# Patient Record
Sex: FEMALE | Race: OTHER | HISPANIC OR LATINO | URBAN - METROPOLITAN AREA
[De-identification: names, ages, dates, MRNs, and addresses within clinical notes are randomized per-mention and may not be internally consistent; named-entity substitution may affect disease eponyms.]

---

## 2021-08-15 ENCOUNTER — EMERGENCY (EMERGENCY)
Facility: HOSPITAL | Age: 41
LOS: 1 days | Discharge: HOME | End: 2021-08-15
Admitting: EMERGENCY MEDICINE
Payer: MEDICAID

## 2021-08-15 VITALS
DIASTOLIC BLOOD PRESSURE: 62 MMHG | RESPIRATION RATE: 16 BRPM | SYSTOLIC BLOOD PRESSURE: 100 MMHG | OXYGEN SATURATION: 99 % | HEART RATE: 79 BPM | TEMPERATURE: 98 F

## 2021-08-15 VITALS
RESPIRATION RATE: 18 BRPM | HEIGHT: 64 IN | HEART RATE: 91 BPM | WEIGHT: 169.98 LBS | DIASTOLIC BLOOD PRESSURE: 68 MMHG | OXYGEN SATURATION: 98 % | SYSTOLIC BLOOD PRESSURE: 105 MMHG | TEMPERATURE: 98 F
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
## 2021-08-15 DIAGNOSIS — Z88.0 ALLERGY STATUS TO PENICILLIN: ICD-10-CM
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
## 2021-08-15 DIAGNOSIS — S92.001A UNSPECIFIED FRACTURE OF RIGHT CALCANEUS, INITIAL ENCOUNTER FOR CLOSED FRACTURE: ICD-10-CM
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
## 2021-08-15 DIAGNOSIS — M19.90 UNSPECIFIED OSTEOARTHRITIS, UNSPECIFIED SITE: ICD-10-CM
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
## 2021-08-15 DIAGNOSIS — Z88.6 ALLERGY STATUS TO ANALGESIC AGENT: ICD-10-CM

## 2021-08-15 DIAGNOSIS — M25.571 PAIN IN RIGHT ANKLE AND JOINTS OF RIGHT FOOT: ICD-10-CM

## 2021-08-15 DIAGNOSIS — F31.9 BIPOLAR DISORDER, UNSPECIFIED: ICD-10-CM

## 2021-08-15 PROCEDURE — 73600 X-RAY EXAM OF ANKLE: CPT | Mod: 26,RT

## 2021-08-15 PROCEDURE — 99284 EMERGENCY DEPT VISIT MOD MDM: CPT

## 2021-08-15 PROCEDURE — 73590 X-RAY EXAM OF LOWER LEG: CPT | Mod: 26,RT

## 2021-08-15 RX ORDER — OXYCODONE AND ACETAMINOPHEN 5; 325 MG/1; MG/1
1 TABLET ORAL ONCE
Refills: 0 | Status: DISCONTINUED | OUTPATIENT
Start: 2021-08-15 | End: 2021-08-15

## 2021-08-15 RX ADMIN — OXYCODONE AND ACETAMINOPHEN 1 TABLET(S): 5; 325 TABLET ORAL at 12:12

## 2021-08-15 NOTE — ED PROVIDER NOTE - MUSCULOSKELETAL, MLM
Spine appears normal, range of motion is not limited, no muscle or joint tenderness RLE: +tenderness along the achilles with swelling along the R posterior ankle (slightly more lateral than medial). negative Arguello's test, no calf tenderness, no overlying skin erythema, +decreased ROM of ankle secondary to pain but motor/sensation intact throughout

## 2021-08-15 NOTE — ED PROVIDER NOTE - PATIENT PORTAL LINK FT
You can access the FollowMyHealth Patient Portal offered by [facility] by registering at the following website: [email or web address]. By joining [email or web address]’s FollowMyHealth portal, you will also be able to view your health information using other applications (apps) compatible with our system.

## 2021-08-15 NOTE — ED PROVIDER NOTE - OBJECTIVE STATEMENT
42 yo F w/ PMHx of bipolar disorder, arthritis, sciatica and gastric bypass presents to the ED c/o R posterior ankle and lower leg pain. Pt states she was walking when she misstepped on the edge of a curb, heard a pop with immediate onset of pain to RLE. Pt is unable to bear weight. Pt notes she injured the R ankle few months ago and believes she exacerbated the injury. Pt denies chest pain, SOB, numbness/tingling/weakness or fall.

## 2021-08-15 NOTE — ED ADULT NURSE REASSESSMENT NOTE - NS ED NURSE REASSESS COMMENT FT1
Pt received from Anabella betancourt RN. Pt resting comfortably in bed. No s/s of acute distress. Will continue to monitor

## 2021-08-15 NOTE — ED PROVIDER NOTE - CLINICAL SUMMARY MEDICAL DECISION MAKING FREE TEXT BOX
Pt presents to the ED c/o RLE pain s/p misstep on the edge of a curb today. Pt is unable to bear weight,. Will order percocet for pain management, xrays of R tib/fib/ankle to rule out fracture. Will give crutches for weight bearing with bulky soto wrap and prescription for extra strength Tylenol sent to pt's pharmacy. Pt advised on orthopedics follow up. Pt presents to the ED c/o RLE pain s/p misstep on the edge of a curb today. Pt is unable to bear weight,. Will order percocet for pain management, xrays of R tib/fib/ankle to rule out fracture. If neg, will give crutches for weight bearing with bulky soto wrap and prescription for extra strength Tylenol sent to pt's pharmacy. Pt advised on orthopedics follow up.

## 2021-08-15 NOTE — ED PROVIDER NOTE - MUSCULOSKELETAL [+], MLM
JOINT PAIN/LIMITED RANGE OF MOTION Normal rate, regular rhythm.  Heart sounds S1, S2.  No murmurs, rubs or gallops.
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
## 2021-08-15 NOTE — ED PROVIDER NOTE - PROGRESS NOTE DETAILS
+Calcaneal fracture.  Pt placed in short leg splint.  Given crutches.  Will place URGENT ortho referral.  Pt stable on DC and leaves in NAD.

## 2021-08-16 PROBLEM — Z00.00 ENCOUNTER FOR PREVENTIVE HEALTH EXAMINATION: Status: ACTIVE | Noted: 2021-08-16

## 2021-08-18 ENCOUNTER — APPOINTMENT (OUTPATIENT)
Dept: ORTHOPEDIC SURGERY | Facility: CLINIC | Age: 41
End: 2021-08-18
Payer: MEDICAID

## 2021-08-18 VITALS — BODY MASS INDEX: 27.82 KG/M2 | RESPIRATION RATE: 16 BRPM | WEIGHT: 167 LBS | HEIGHT: 65 IN

## 2021-08-18 DIAGNOSIS — Z82.61 FAMILY HISTORY OF ARTHRITIS: ICD-10-CM

## 2021-08-18 DIAGNOSIS — Z87.898 PERSONAL HISTORY OF OTHER SPECIFIED CONDITIONS: ICD-10-CM

## 2021-08-18 DIAGNOSIS — M67.88 OTHER SPECIFIED DISORDERS OF SYNOVIUM AND TENDON, OTHER SITE: ICD-10-CM

## 2021-08-18 DIAGNOSIS — Z87.39 PERSONAL HISTORY OF OTHER DISEASES OF THE MUSCULOSKELETAL SYSTEM AND CONNECTIVE TISSUE: ICD-10-CM

## 2021-08-18 DIAGNOSIS — Z87.09 PERSONAL HISTORY OF OTHER DISEASES OF THE RESPIRATORY SYSTEM: ICD-10-CM

## 2021-08-18 DIAGNOSIS — F17.200 NICOTINE DEPENDENCE, UNSPECIFIED, UNCOMPLICATED: ICD-10-CM

## 2021-08-18 PROCEDURE — 99203 OFFICE O/P NEW LOW 30 MIN: CPT

## 2021-09-29 ENCOUNTER — APPOINTMENT (OUTPATIENT)
Dept: ORTHOPEDIC SURGERY | Facility: CLINIC | Age: 41
End: 2021-09-29

## 2021-11-04 ENCOUNTER — EMERGENCY (EMERGENCY)
Facility: HOSPITAL | Age: 41
LOS: 1 days | Discharge: HOME | End: 2021-11-04
Admitting: EMERGENCY MEDICINE
Payer: MEDICAID

## 2021-11-04 VITALS
HEART RATE: 102 BPM | HEIGHT: 64 IN | RESPIRATION RATE: 18 BRPM | WEIGHT: 164.91 LBS | TEMPERATURE: 98 F | DIASTOLIC BLOOD PRESSURE: 64 MMHG | OXYGEN SATURATION: 96 % | SYSTOLIC BLOOD PRESSURE: 114 MMHG

## 2021-11-04 DIAGNOSIS — Z88.6 ALLERGY STATUS TO ANALGESIC AGENT: ICD-10-CM

## 2021-11-04 DIAGNOSIS — M79.642 PAIN IN LEFT HAND: ICD-10-CM
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
## 2021-11-04 DIAGNOSIS — Z88.2 ALLERGY STATUS TO SULFONAMIDES: ICD-10-CM

## 2021-11-04 DIAGNOSIS — T63.441A TOXIC EFFECT OF VENOM OF BEES, ACCIDENTAL (UNINTENTIONAL), INITIAL ENCOUNTER: ICD-10-CM
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
## 2021-11-04 PROCEDURE — 99284 EMERGENCY DEPT VISIT MOD MDM: CPT

## 2021-11-04 RX ORDER — DIPHENHYDRAMINE HCL 50 MG
25 CAPSULE ORAL ONCE
Refills: 0 | Status: COMPLETED | OUTPATIENT
Start: 2021-11-04 | End: 2021-11-04

## 2021-11-04 RX ADMIN — Medication 25 MILLIGRAM(S): at 17:43

## 2021-11-04 NOTE — ED PROVIDER NOTE - OBJECTIVE STATEMENT
40 y/o female presents complaining of left palm pain after being stung by a bee. Patient presents with redness and pain to the left palm. Denies fever, chills, shortness of breath, chest pain, cough, nausea, and vomiting.

## 2021-11-04 NOTE — ED PROVIDER NOTE - PATIENT PORTAL LINK FT
You can access the FollowMyHealth Patient Portal offered by [facility] by registering at the following website: [email or web address]. By joining [employer]’s FollowMyHealth portal, you will also be able to view your health information using other applications (apps) compatible with our system.

## 2021-11-04 NOTE — ED ADULT TRIAGE NOTE - CHIEF COMPLAINT QUOTE
left palm hand pain from bee sting x1hr pta. no rash no hives no dyspnea no facial edema, speaking in loud long sentences, eating comfortably.

## 2021-11-04 NOTE — ED PROVIDER NOTE - CLINICAL SUMMARY MEDICAL DECISION MAKING FREE TEXT BOX
40 y/o female presents with bee sting to the left palm. Stinger removed. Patient appears well, given return precautions and discharged.

## 2021-11-19 ENCOUNTER — EMERGENCY (EMERGENCY)
Facility: HOSPITAL | Age: 41
LOS: 1 days | Discharge: HOME | End: 2021-11-19
Attending: EMERGENCY MEDICINE | Admitting: EMERGENCY MEDICINE
Payer: MEDICAID

## 2021-11-19 VITALS
RESPIRATION RATE: 16 BRPM | HEART RATE: 61 BPM | HEIGHT: 64 IN | TEMPERATURE: 98 F | WEIGHT: 164.91 LBS | SYSTOLIC BLOOD PRESSURE: 107 MMHG | OXYGEN SATURATION: 99 % | DIASTOLIC BLOOD PRESSURE: 66 MMHG

## 2021-11-19 DIAGNOSIS — M13.80 OTHER SPECIFIED ARTHRITIS, UNSPECIFIED SITE: ICD-10-CM

## 2021-11-19 DIAGNOSIS — M25.511 PAIN IN RIGHT SHOULDER: ICD-10-CM
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
## 2021-11-19 DIAGNOSIS — Z88.0 ALLERGY STATUS TO PENICILLIN: ICD-10-CM

## 2021-11-19 DIAGNOSIS — M54.50 LOW BACK PAIN, UNSPECIFIED: ICD-10-CM

## 2021-11-19 DIAGNOSIS — M25.561 PAIN IN RIGHT KNEE: ICD-10-CM
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
## 2021-11-19 DIAGNOSIS — Z98.84 BARIATRIC SURGERY STATUS: ICD-10-CM

## 2021-11-19 DIAGNOSIS — Z88.6 ALLERGY STATUS TO ANALGESIC AGENT: ICD-10-CM

## 2021-11-19 PROCEDURE — 99284 EMERGENCY DEPT VISIT MOD MDM: CPT

## 2021-11-19 RX ORDER — ACETAMINOPHEN 500 MG
975 TABLET ORAL ONCE
Refills: 0 | Status: COMPLETED | OUTPATIENT
Start: 2021-11-19 | End: 2021-11-19

## 2021-11-19 RX ADMIN — Medication 500 MILLIGRAM(S): at 21:27

## 2021-11-19 RX ADMIN — Medication 975 MILLIGRAM(S): at 21:11

## 2021-11-19 RX ADMIN — Medication 500 MILLIGRAM(S): at 21:25

## 2021-11-19 RX ADMIN — Medication 975 MILLIGRAM(S): at 19:45
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
## 2021-11-19 NOTE — ED PROVIDER NOTE - NSFOLLOWUPINSTRUCTIONS_ED_ALL_ED_FT
Rest and stay well hydrated.    Follow up with your primary care physician in 1-3 days.    Take over the counter acetaminophen (Tylenol) 650-1000 mg every 4-6 hours as needed for pain. Do not take more than 3000 mg in a 24 hour period. Be aware many over the counter and prescription medications also contain acetaminophen (Tylenol).     SEEK IMMEDIATE MEDICAL CARE IF YOU HAVE ANY OF THE FOLLOWING SYMPTOMS: numbness, tingling, or weakness in your arms or legs, severe neck pain, changes in bowel or bladder control, shortness of breath, chest pain, blood in your urine/stool/vomit, headache, visual changes, lightheadedness/dizziness, or fainting. Rest and stay well hydrated.    Follow up with your primary care physician in 1-3 days.    Take over the counter acetaminophen (Tylenol) 650-1000 mg every 4-6 hours as needed for pain. Do not take more than 3000 mg in a 24 hour period. Be aware many over the counter and prescription medications also contain acetaminophen (Tylenol).     Take prescription naproxen 500 mg twice daily as needed for pain.    SEEK IMMEDIATE MEDICAL CARE IF YOU HAVE ANY OF THE FOLLOWING SYMPTOMS: numbness, tingling, or weakness in your arms or legs, severe neck pain, changes in bowel or bladder control, shortness of breath, chest pain, blood in your urine/stool/vomit, headache, visual changes, lightheadedness/dizziness, or fainting.

## 2021-11-19 NOTE — ED PROVIDER NOTE - NS ED ROS FT
Review of Systems    Constitutional: (-) fever, (-) chills, (-) fatigue  HEENT: (-) sore throat, (-) hearing loss, (-) nasal congestion  Cardiovascular: (-) chest pain, (-) syncope  Respiratory: (-) cough, (-) shortness of breath  Gastrointestinal: (-) vomiting, (-) diarrhea, (-) abdominal pain  Musculoskeletal: (-) neck pain, (+) back pain, (+) shoulder pain  Integumentary: (-) rash, (-) edema, (-) wound  Neurological: (-) headache, (-) altered mental status    Except as documented in the HPI, all other systems are negative.
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
## 2021-11-19 NOTE — ED ADULT NURSE NOTE - CHPI ED NUR SYMPTOMS NEG
no abrasion/no blurred vision/no change in level of consciousness/no loss of consciousness/no seizure/no vomiting/no weakness

## 2021-11-19 NOTE — ED PROVIDER NOTE - PATIENT PORTAL LINK FT
You can access the FollowMyHealth Patient Portal offered by [facility] by registering at the following website: [email or web address]. By joining [employer]’s FollowMyHealth portal, you will also be able to view your health information using other applications (apps) compatible with our system.

## 2021-11-19 NOTE — ED PROVIDER NOTE - PHYSICAL EXAMINATION
CONSTITUTIONAL: non-toxic, well appearing  SKIN: no rash, no petechiae.  EYES: PERRL, EOMI, pink conjunctiva, anicteric  ENT: NC/AT. Tongue and uvular midline, no exudates, moist mucous membranes. No septal hematoma visualized. TM intact bilaterally, no hemotympanum bilaterally, negative Garcia sign bilaterally.  NECK: Supple; no meningismus, no JVD  CARD: RRR, no murmurs, equal radial pulses bilaterally 2+  RESP: CTAB, no respiratory distress  ABD: Soft, non-tender, non-distended, no peritoneal signs, no CVA tenderness  SPINE: no midline bony tenderness over cervical, thoracic, or lumbar spine  EXT: Normal ROM x4. No edema. Pelvis stable. FROM of bilateral hips and knees, no bony tenderness.   NEURO: Alert, oriented. Neuro exam nonfocal, equal strength bilaterally. Ambulatory without assistance with steady gait. 5/5 strength bilateral upper and lower extremities.   PSYCH: Cooperative, appropriate.

## 2022-01-01 ENCOUNTER — EMERGENCY (EMERGENCY)
Facility: HOSPITAL | Age: 42
LOS: 1 days | Discharge: HOME | End: 2022-01-01
Admitting: EMERGENCY MEDICINE
Payer: MEDICAID

## 2022-01-01 VITALS
OXYGEN SATURATION: 99 % | RESPIRATION RATE: 16 BRPM | HEART RATE: 84 BPM | SYSTOLIC BLOOD PRESSURE: 125 MMHG | DIASTOLIC BLOOD PRESSURE: 85 MMHG | TEMPERATURE: 99 F

## 2022-01-01 DIAGNOSIS — Z88.6 ALLERGY STATUS TO ANALGESIC AGENT: ICD-10-CM
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
## 2022-01-01 DIAGNOSIS — M79.642 PAIN IN LEFT HAND: ICD-10-CM

## 2022-01-01 DIAGNOSIS — S60.222A CONTUSION OF LEFT HAND, INITIAL ENCOUNTER: ICD-10-CM
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
## 2022-01-01 PROCEDURE — 99283 EMERGENCY DEPT VISIT LOW MDM: CPT | Mod: 25

## 2022-01-01 PROCEDURE — 73130 X-RAY EXAM OF HAND: CPT | Mod: 26,LT

## 2022-01-01 RX ORDER — ACETAMINOPHEN 500 MG
650 TABLET ORAL ONCE
Refills: 0 | Status: COMPLETED | OUTPATIENT
Start: 2022-01-01 | End: 2022-01-01

## 2022-01-01 RX ADMIN — Medication 650 MILLIGRAM(S): at 12:03
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
## 2022-01-01 NOTE — ED PROVIDER NOTE - CHPI ED SYMPTOMS NEG
no thumb pain, no wrist pain, no neck pain, no hematuria, no nausea, no SOB, no chest pain/no back pain/no vomiting

## 2022-01-01 NOTE — ED PROVIDER NOTE - MUSCULOSKELETAL, MLM
Left hand 4x4cm area of bruising crossing her 2nd, 3rd, and 4th, knuckles and the dorsum of her hand, pain with ROM with flexion of the digits which limits the full flexion of the digits. Normal alignment, no crepitus. Skin is intact. No snuff box tenderness. Normal ROM of the thumb. No tenderness to the wrist, forearm, elbow, and remainder of the extremity. No midline vertebral tenderness or step off deformity. 3cm bruise on right buttocks.

## 2022-01-01 NOTE — ED PROVIDER NOTE - PATIENT PORTAL LINK FT
You can access the FollowMyHealth Patient Portal offered by [facility] by registering at the following website: [email or web address]. By joining [employer]’s FollowMyHealth portal, you will also be able to view your health information using other applications (apps) compatible with our system.
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
## 2022-01-23 ENCOUNTER — EMERGENCY (EMERGENCY)
Facility: HOSPITAL | Age: 42
LOS: 1 days | Discharge: ANOTHER HOSPITAL | End: 2022-01-23
Attending: EMERGENCY MEDICINE | Admitting: EMERGENCY MEDICINE
Payer: MEDICAID

## 2022-01-23 VITALS
TEMPERATURE: 98 F | HEIGHT: 64 IN | HEART RATE: 75 BPM | OXYGEN SATURATION: 98 % | RESPIRATION RATE: 16 BRPM | WEIGHT: 169.98 LBS | DIASTOLIC BLOOD PRESSURE: 75 MMHG | SYSTOLIC BLOOD PRESSURE: 110 MMHG

## 2022-01-23 VITALS
DIASTOLIC BLOOD PRESSURE: 76 MMHG | TEMPERATURE: 98 F | OXYGEN SATURATION: 97 % | SYSTOLIC BLOOD PRESSURE: 118 MMHG | RESPIRATION RATE: 17 BRPM | HEART RATE: 85 BPM

## 2022-01-23 DIAGNOSIS — Z88.2 ALLERGY STATUS TO SULFONAMIDES: ICD-10-CM

## 2022-01-23 DIAGNOSIS — F31.62 BIPOLAR DISORDER, CURRENT EPISODE MIXED, MODERATE: ICD-10-CM

## 2022-01-23 DIAGNOSIS — R45.851 SUICIDAL IDEATIONS: ICD-10-CM
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
## 2022-01-23 DIAGNOSIS — S60.946A UNSPECIFIED SUPERFICIAL INJURY OF RIGHT LITTLE FINGER, INITIAL ENCOUNTER: ICD-10-CM

## 2022-01-23 DIAGNOSIS — F32.9 MAJOR DEPRESSIVE DISORDER, SINGLE EPISODE, UNSPECIFIED: ICD-10-CM

## 2022-01-23 DIAGNOSIS — F33.1 MAJOR DEPRESSIVE DISORDER, RECURRENT, MODERATE: ICD-10-CM

## 2022-01-23 DIAGNOSIS — F15.10 OTHER STIMULANT ABUSE, UNCOMPLICATED: ICD-10-CM

## 2022-01-23 DIAGNOSIS — Z88.6 ALLERGY STATUS TO ANALGESIC AGENT: ICD-10-CM
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
## 2022-01-23 DIAGNOSIS — U07.1 COVID-19: ICD-10-CM

## 2022-01-23 LAB
ALBUMIN SERPL ELPH-MCNC: 3 G/DL — LOW (ref 3.4–5)
ALP SERPL-CCNC: 104 U/L — SIGNIFICANT CHANGE UP (ref 40–120)
ALT FLD-CCNC: 22 U/L — SIGNIFICANT CHANGE UP (ref 12–42)
AMPHET UR-MCNC: POSITIVE
ANION GAP SERPL CALC-SCNC: 10 MMOL/L — SIGNIFICANT CHANGE UP (ref 9–16)
APAP SERPL-MCNC: 15.5 UG/ML — SIGNIFICANT CHANGE UP (ref 10–30)
APPEARANCE UR: CLEAR — SIGNIFICANT CHANGE UP
AST SERPL-CCNC: 22 U/L — SIGNIFICANT CHANGE UP (ref 15–37)
BARBITURATES UR SCN-MCNC: NEGATIVE — SIGNIFICANT CHANGE UP
BENZODIAZ UR-MCNC: NEGATIVE — SIGNIFICANT CHANGE UP
BILIRUB SERPL-MCNC: 0.2 MG/DL — SIGNIFICANT CHANGE UP (ref 0.2–1.2)
BILIRUB UR-MCNC: ABNORMAL
BUN SERPL-MCNC: 19 MG/DL — SIGNIFICANT CHANGE UP (ref 7–23)
CALCIUM SERPL-MCNC: 7.9 MG/DL — LOW (ref 8.5–10.5)
CHLORIDE SERPL-SCNC: 105 MMOL/L — SIGNIFICANT CHANGE UP (ref 96–108)
CO2 SERPL-SCNC: 23 MMOL/L — SIGNIFICANT CHANGE UP (ref 22–31)
COCAINE METAB.OTHER UR-MCNC: NEGATIVE — SIGNIFICANT CHANGE UP
COLOR SPEC: YELLOW — SIGNIFICANT CHANGE UP
CREAT SERPL-MCNC: 0.86 MG/DL — SIGNIFICANT CHANGE UP (ref 0.5–1.3)
DIFF PNL FLD: NEGATIVE — SIGNIFICANT CHANGE UP
ETHANOL SERPL-MCNC: 92 MG/DL — HIGH
GLUCOSE SERPL-MCNC: 103 MG/DL — HIGH (ref 70–99)
GLUCOSE UR QL: NEGATIVE — SIGNIFICANT CHANGE UP
HCG UR QL: NEGATIVE — SIGNIFICANT CHANGE UP
HCT VFR BLD CALC: 33 % — LOW (ref 34.5–45)
HGB BLD-MCNC: 10.3 G/DL — LOW (ref 11.5–15.5)
KETONES UR-MCNC: ABNORMAL MG/DL
LEUKOCYTE ESTERASE UR-ACNC: NEGATIVE — SIGNIFICANT CHANGE UP
MCHC RBC-ENTMCNC: 27 PG — SIGNIFICANT CHANGE UP (ref 27–34)
MCHC RBC-ENTMCNC: 31.2 GM/DL — LOW (ref 32–36)
MCV RBC AUTO: 86.6 FL — SIGNIFICANT CHANGE UP (ref 80–100)
METHADONE UR-MCNC: NEGATIVE — SIGNIFICANT CHANGE UP
NITRITE UR-MCNC: NEGATIVE — SIGNIFICANT CHANGE UP
NRBC # BLD: 0 /100 WBCS — SIGNIFICANT CHANGE UP (ref 0–0)
OPIATES UR-MCNC: NEGATIVE — SIGNIFICANT CHANGE UP
PCP SPEC-MCNC: SIGNIFICANT CHANGE UP
PCP UR-MCNC: NEGATIVE — SIGNIFICANT CHANGE UP
PH UR: 5 — SIGNIFICANT CHANGE UP (ref 5–8)
PLATELET # BLD AUTO: 381 K/UL — SIGNIFICANT CHANGE UP (ref 150–400)
POTASSIUM SERPL-MCNC: 3.8 MMOL/L — SIGNIFICANT CHANGE UP (ref 3.5–5.3)
POTASSIUM SERPL-SCNC: 3.8 MMOL/L — SIGNIFICANT CHANGE UP (ref 3.5–5.3)
PROT SERPL-MCNC: 7 G/DL — SIGNIFICANT CHANGE UP (ref 6.4–8.2)
PROT UR-MCNC: NEGATIVE MG/DL — SIGNIFICANT CHANGE UP
RBC # BLD: 3.81 M/UL — SIGNIFICANT CHANGE UP (ref 3.8–5.2)
RBC # FLD: 16 % — HIGH (ref 10.3–14.5)
SALICYLATES SERPL-MCNC: 3.2 MG/DL — SIGNIFICANT CHANGE UP (ref 2.8–20)
SARS-COV-2 RNA SPEC QL NAA+PROBE: DETECTED
SODIUM SERPL-SCNC: 138 MMOL/L — SIGNIFICANT CHANGE UP (ref 132–145)
SP GR SPEC: >=1.03 — SIGNIFICANT CHANGE UP (ref 1–1.03)
THC UR QL: POSITIVE
TSH SERPL-MCNC: 3.01 UIU/ML — SIGNIFICANT CHANGE UP (ref 0.36–3.74)
UROBILINOGEN FLD QL: 0.2 E.U./DL — SIGNIFICANT CHANGE UP
WBC # BLD: 6.9 K/UL — SIGNIFICANT CHANGE UP (ref 3.8–10.5)
WBC # FLD AUTO: 6.9 K/UL — SIGNIFICANT CHANGE UP (ref 3.8–10.5)

## 2022-01-23 PROCEDURE — 93010 ELECTROCARDIOGRAM REPORT: CPT

## 2022-01-23 PROCEDURE — 73130 X-RAY EXAM OF HAND: CPT | Mod: 26,RT

## 2022-01-23 PROCEDURE — 99285 EMERGENCY DEPT VISIT HI MDM: CPT | Mod: 25

## 2022-01-23 PROCEDURE — 90792 PSYCH DIAG EVAL W/MED SRVCS: CPT | Mod: 95

## 2022-01-23 RX ORDER — PANTOPRAZOLE SODIUM 20 MG/1
40 TABLET, DELAYED RELEASE ORAL ONCE
Refills: 0 | Status: COMPLETED | OUTPATIENT
Start: 2022-01-23 | End: 2022-01-23

## 2022-01-23 RX ORDER — OXYCODONE AND ACETAMINOPHEN 5; 325 MG/1; MG/1
1 TABLET ORAL ONCE
Refills: 0 | Status: DISCONTINUED | OUTPATIENT
Start: 2022-01-23 | End: 2022-01-23

## 2022-01-23 RX ORDER — NICOTINE POLACRILEX 2 MG
1 GUM BUCCAL DAILY
Refills: 0 | Status: DISCONTINUED | OUTPATIENT
Start: 2022-01-23 | End: 2022-01-26

## 2022-01-23 RX ORDER — ACETAMINOPHEN 500 MG
975 TABLET ORAL ONCE
Refills: 0 | Status: COMPLETED | OUTPATIENT
Start: 2022-01-23 | End: 2022-01-23

## 2022-01-23 RX ADMIN — PANTOPRAZOLE SODIUM 40 MILLIGRAM(S): 20 TABLET, DELAYED RELEASE ORAL at 17:40

## 2022-01-23 RX ADMIN — OXYCODONE AND ACETAMINOPHEN 1 TABLET(S): 5; 325 TABLET ORAL at 17:40

## 2022-01-23 RX ADMIN — Medication 975 MILLIGRAM(S): at 10:33

## 2022-01-23 RX ADMIN — Medication 1 PATCH: at 10:33

## 2022-01-23 NOTE — ED PROVIDER NOTE - PROGRESS NOTE DETAILS
Pt medically cleared for psychiatry consultation. Psychiatry consult appreciated; d/w Dr. Grimaldo. Will re-assess pt later this am for active suicidality. Given pt's covid status, if hospitalization warranted, limited to covid psych unit. Will s/o to day team pending psychiatry re-evaluation.
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
## 2022-01-23 NOTE — ED PROVIDER NOTE - CARE PLAN
1 Principal Discharge DX:	Major depression   Principal Discharge DX:	Major depression  Secondary Diagnosis:	Injury of right little finger

## 2022-01-23 NOTE — ED BEHAVIORAL HEALTH ASSESSMENT NOTE - CURRENT MEDICATION
gives inconsistent report- first says she stopped medications weeks ago, then says she is still taking seroquel 100mg HS and trazodone 50 HS but that she stopped sertraline

## 2022-01-23 NOTE — ED ADULT TRIAGE NOTE - CHIEF COMPLAINT QUOTE
"I need to go back to a psych [facility]. I have thoughts of hurting myself and others. I haven't been taking my psych meds." Pt with no specific plan to hurt herself. Admits to drinking alcohol tonight.

## 2022-01-23 NOTE — ED PROVIDER NOTE - CLINICAL SUMMARY MEDICAL DECISION MAKING FREE TEXT BOX
placed on suicide precautions 1:1 observation, will obtain medical screening labs, ekg. Psychiatry consult if/when medically cleared. placed on suicide precautions 1:1 observation, will obtain medical screening labs, ekg. Psychiatry consult if/when medically cleared.    Pt has an injury to R 5th digit, kyle taped, no clear acute fx on xray, neurovasc intact, cleared to continue wearing kyle tape for comfort while in psychiatric unit. PRN tylenol for pain. Does not require splinting or emergent Hand specialist f/u.
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
## 2022-01-23 NOTE — ED BEHAVIORAL HEALTH NOTE - BEHAVIORAL HEALTH NOTE
Telepsychiatry Update    Discussed hand injury with ER MD attending. Pt is medically cleared (please refer to provider note by Dr. Aguilera). Patient will require kyle tape, can receive Tylenol PRN for pain, and will need to follow up with hand specialist after discharge.     Discussed with [facility] psychiatry MD. They will accept pt with 1:1 on unit due to tape.

## 2022-01-23 NOTE — ED BEHAVIORAL HEALTH ASSESSMENT NOTE - PRIMARY DX
Bipolar disorder, current episode mixed, moderate MDD (major depressive disorder), recurrent episode, moderate
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
## 2022-01-23 NOTE — ED BEHAVIORAL HEALTH NOTE - BEHAVIORAL HEALTH NOTE
============  ED COURSE  ============  SOURCE:  RN and secondhand ED documentation.  ARRIVAL:  Per chart and RN, patient arrived as a walk-in unaccompanied by supports. Per RN, patient was calm upon arrival, and compliant with triage process.  BELONGINGS:  No belongings of note. All belongings were provided to hospital security, and patient is currently in a gown with a 1:1 staff member.  BEHAVIOR: RN stated that overnight pt was calm, slept for duration of the night, presenting with linear thought process, is AAOx3, presenting with stable mood and appropriate affect, remains in good behavioral and impulse control, is not currently violent/aggressive. Per chart, pt was reporting SI (no plan/intent indicated), denies HI/A/VH, reported EtOH consumption prior to arrival to the ED.   TREATMENT:  Per chart and RN, patient did not require PRN medications.   VISITORS:  None

## 2022-01-23 NOTE — ED ADULT NURSE NOTE - OBJECTIVE STATEMENT
pt a&ox3 states "I need to go back to a psych [facility]. I have thoughts of hurting myself and others. I haven't been taking my psych meds." Pt with no specific plan to hurt herself. Admits to drinking alcohol tonight. has dried white residue on nose and reports that she snorted her gabapentin today "to try it." belongings secured. 1:1 observation in place. will continue to monitor.

## 2022-01-23 NOTE — ED BEHAVIORAL HEALTH ASSESSMENT NOTE - RISK ASSESSMENT
Moderate Acute Suicide Risk chronic rf: hx attempt, hx NSSI, admissions, PEBBLES  acute rf: active PEBBLES, irritability  pf: help seeking
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
## 2022-01-23 NOTE — ED ADULT NURSE REASSESSMENT NOTE - NS ED NURSE REASSESS COMMENT FT1
Pt received from previous shift RN A&Ox3, spon resps on Ra unlabored, NAD.  Pt remains on a 1:1 for SI. Report has been given to admitted floor. Pending transportation.

## 2022-03-17 PROBLEM — Z78.9 OTHER SPECIFIED HEALTH STATUS: Chronic | Status: INACTIVE | Noted: 2021-08-15 | Resolved: 2022-01-23

## 2022-08-15 NOTE — ED PROVIDER NOTE - SPECIALTY CARE
Methotrexate Counseling:  Patient counseled regarding adverse effects of methotrexate including but not limited to nausea, vomiting, abnormalities in liver function tests. Patients may develop mouth sores, rash, diarrhea, and abnormalities in blood counts. The patient understands that monitoring is required including LFT's and blood counts.  There is a rare possibility of scarring of the liver and lung problems that can occur when taking methotrexate. Persistent nausea, loss of appetite, pale stools, dark urine, cough, and shortness of breath should be reported immediately. Patient advised to discontinue methotrexate treatment at least three months before attempting to become pregnant.  I discussed the need for folate supplements while taking methotrexate.  These supplements can decrease side effects during methotrexate treatment. The patient verbalized understanding of the proper use and possible adverse effects of methotrexate.  All of the patient's questions and concerns were addressed. Orthopedic Surgery

## 2022-09-27 NOTE — ED ADULT TRIAGE NOTE - HEIGHT IN FEET
-71-5

## 2022-12-02 ENCOUNTER — EMERGENCY (EMERGENCY)
Facility: HOSPITAL | Age: 42
LOS: 1 days | Discharge: HOME | End: 2022-12-02
Admitting: EMERGENCY MEDICINE

## 2022-12-02 VITALS
WEIGHT: 184.97 LBS | HEART RATE: 83 BPM | OXYGEN SATURATION: 95 % | RESPIRATION RATE: 18 BRPM | HEIGHT: 65 IN | DIASTOLIC BLOOD PRESSURE: 79 MMHG | TEMPERATURE: 98 F | SYSTOLIC BLOOD PRESSURE: 121 MMHG

## 2022-12-02 LAB
APPEARANCE UR: CLEAR — SIGNIFICANT CHANGE UP
BILIRUB UR-MCNC: NEGATIVE — SIGNIFICANT CHANGE UP
COLOR SPEC: YELLOW — SIGNIFICANT CHANGE UP
DIFF PNL FLD: NEGATIVE — SIGNIFICANT CHANGE UP
GLUCOSE UR QL: NEGATIVE — SIGNIFICANT CHANGE UP
HCG UR QL: NEGATIVE — SIGNIFICANT CHANGE UP
KETONES UR-MCNC: NEGATIVE — SIGNIFICANT CHANGE UP
LEUKOCYTE ESTERASE UR-ACNC: NEGATIVE — SIGNIFICANT CHANGE UP
NITRITE UR-MCNC: NEGATIVE — SIGNIFICANT CHANGE UP
PH UR: 6 — SIGNIFICANT CHANGE UP (ref 5–8)
PROT UR-MCNC: NEGATIVE MG/DL — SIGNIFICANT CHANGE UP
SP GR SPEC: 1.02 — SIGNIFICANT CHANGE UP (ref 1–1.03)
UROBILINOGEN FLD QL: 0.2 E.U./DL — SIGNIFICANT CHANGE UP

## 2022-12-02 PROCEDURE — 99284 EMERGENCY DEPT VISIT MOD MDM: CPT

## 2022-12-02 RX ORDER — DEXAMETHASONE 0.5 MG/5ML
6 ELIXIR ORAL ONCE
Refills: 0 | Status: COMPLETED | OUTPATIENT
Start: 2022-12-02 | End: 2022-12-02

## 2022-12-02 RX ORDER — DIAZEPAM 5 MG
5 TABLET ORAL ONCE
Refills: 0 | Status: DISCONTINUED | OUTPATIENT
Start: 2022-12-02 | End: 2022-12-02

## 2022-12-02 RX ORDER — LIDOCAINE 4 G/100G
1 CREAM TOPICAL ONCE
Refills: 0 | Status: COMPLETED | OUTPATIENT
Start: 2022-12-02 | End: 2022-12-02

## 2022-12-02 RX ORDER — KETOROLAC TROMETHAMINE 30 MG/ML
60 SYRINGE (ML) INJECTION ONCE
Refills: 0 | Status: DISCONTINUED | OUTPATIENT
Start: 2022-12-02 | End: 2022-12-02

## 2022-12-02 RX ADMIN — Medication 5 MILLIGRAM(S): at 04:32

## 2022-12-02 RX ADMIN — LIDOCAINE 1 PATCH: 4 CREAM TOPICAL at 05:39

## 2022-12-02 RX ADMIN — Medication 60 MILLIGRAM(S): at 04:31

## 2022-12-02 RX ADMIN — Medication 6 MILLIGRAM(S): at 04:31

## 2022-12-02 NOTE — ED PROVIDER NOTE - NSICDXPASTMEDICALHX_GEN_ALL_CORE_FT
PAST MEDICAL HISTORY:  Arthritis     Bipolar disorder     Chronic back pain      PAST MEDICAL HISTORY:  Arthritis     Bipolar disorder     Chronic back pain     Lumbar herniated disc

## 2022-12-02 NOTE — ED PROVIDER NOTE - PATIENT PORTAL LINK FT
You can access the FollowMyHealth Patient Portal offered by [facility] by registering at the following website: [email or web address]. By joining [employer]’s FollowMyHealth portal, you will also be able to view your health information using other applications (apps) compatible with our system.

## 2022-12-02 NOTE — ED PROVIDER NOTE - CARE PROVIDER_API CALL
Elton Don  [address] [place] [address]  Phone: [phone]  Fax: [phone]  Follow Up Time:     Arnaldo Lay)  Orthopedics  [address] [address]  Phone: [phone]  Fax: [phone]  Follow Up Time:

## 2022-12-02 NOTE — ED PROVIDER NOTE - PROGRESS NOTE DETAILS
pain improved s/p IM toradol/dex with po valium in the ED, ambulatory with steady gait, no saddle anesthesia, voiding spontaneously, NV and neurologically intact on exam. encouraged prompt f/u with pain management and will provide additional lidocaine patches for pain control. precautions discussed, pt amendable to current plan

## 2022-12-02 NOTE — ED PROVIDER NOTE - CLINICAL SUMMARY MEDICAL DECISION MAKING FREE TEXT BOX
pt with h/o herniated lumbar disc and chronic LBP, atraumatic and ambulatory in the ED with steady gait. no s/s of bowel/urinary involvement or saddle anesthesia, voiding spontaneously, U/A negative, pain adequately controlled, improved s/p IM toradol/dex with po valium in the ED, ambulatory with steady gait, NV and neurologically intact on exam. AFVSS at time of d/c, pt non-toxic appearing, results, ddx, and f/u plans discussed with pt at bedside, d/c'd home to f/u with pain management, strict return precautions discussed, prompt return to ER for any worsening or new sx, pt verbalized understanding.

## 2022-12-02 NOTE — ED ADULT NURSE NOTE - IN THE PAST 12 MONTHS HAVE YOU USED DRUGS OTHER THAN THOSE REQUIRED FOR MEDICAL REASON?
Note Text (......Xxx Chief Complaint.): This diagnosis correlates with the Detail Level: Detailed Other (Free Text): T Spot sent to [facility] No

## 2022-12-02 NOTE — ED ADULT TRIAGE NOTE - CHIEF COMPLAINT QUOTE
BIBA. Pt states she was tying her shoe when her back started to hurt. Pt reports chronic back pain that radiates down both legs but tonight its worse on the left side or back. No further at triage. Pt ambulating without issue.

## 2022-12-02 NOTE — ED PROVIDER NOTE - OBJECTIVE STATEMENT
43 yo F with PMHx of chronic back pain 2/2 herniated lumbar disc, schizoaffective, bipolar, presenting c/o worsening lower back pain. 41 yo F with PMHx of chronic back pain 2/2 herniated lumbar disc, schizoaffective, bipolar, presenting c/o worsening lower back pain x 1 wk. Pt reports usually taking tylenol and gabapentin for chronic back pain 2/2 herniated lumbar disc. Was bending over to tie her shoes one wk ago and noted progressive worsening pain radiating to BLE and hips. Noted R > L and currently doesn't have a pain management doctor. Reports receiving a cortisone shot to the back approximately 7-8 yrs ago. Denies fever, chills, dysuria, hematuria, flank pain, change in urinary/bowel function, numbness, tingling, focal weakness, abdominal pain, N/V/D/C, melena, hematochezia, CP, SOB, palpitations, diaphoresis, dizziness, HA, cough, rash, trauma, and fall.

## 2022-12-02 NOTE — ED PROVIDER NOTE - PHYSICAL EXAMINATION
Gen - WDWN, NAD, comfortable and non-toxic appearing  Skin - warm, dry, intact   HEENT - AT/NC, no nasal discharge, airway patent, neck supple and FROM  CV - S1S2, R/R/R  Resp - CTAB, no r/r/w  GI - soft, ND, NT, no CVAT b/l   MS - No acute or gross deformities noted to extremities. mild paraspinal tenderness, no midline tenderness, step off, crepitus, erythema, edema, ecchymosis, or deformity, +straight leg test.   Neuro - AxOx3, ambulatory with steady gait Gen - Unkempt F, NAD, comfortable and non-toxic appearing  Skin - warm, dry, intact   HEENT - AT/NC, no nasal discharge, airway patent, neck supple and FROM  CV - S1S2, R/R/R  Resp - CTAB, no r/r/w  GI - soft, ND, NT, no CVAT b/l    - no saddle anesthesia   MS - No acute or gross deformities noted to extremities. mild paraspinal tenderness, no midline tenderness, step off, crepitus, erythema, edema, ecchymosis, or deformity, +straight leg test b/l.   Neuro - AxOx3, ambulatory with steady gait

## 2022-12-05 DIAGNOSIS — M19.90 UNSPECIFIED OSTEOARTHRITIS, UNSPECIFIED SITE: ICD-10-CM

## 2022-12-05 DIAGNOSIS — M54.42 LUMBAGO WITH SCIATICA, LEFT SIDE: ICD-10-CM

## 2022-12-05 DIAGNOSIS — M54.50 LOW BACK PAIN, UNSPECIFIED: ICD-10-CM

## 2022-12-05 DIAGNOSIS — F31.9 BIPOLAR DISORDER, UNSPECIFIED: ICD-10-CM

## 2022-12-05 DIAGNOSIS — M54.41 LUMBAGO WITH SCIATICA, RIGHT SIDE: ICD-10-CM

## 2022-12-05 DIAGNOSIS — Z87.39 PERSONAL HISTORY OF OTHER DISEASES OF THE MUSCULOSKELETAL SYSTEM AND CONNECTIVE TISSUE: ICD-10-CM

## 2022-12-05 DIAGNOSIS — F20.9 SCHIZOPHRENIA, UNSPECIFIED: ICD-10-CM

## 2022-12-06 ENCOUNTER — EMERGENCY (EMERGENCY)
Facility: HOSPITAL | Age: 42
LOS: 1 days | Discharge: HOME | End: 2022-12-06
Attending: EMERGENCY MEDICINE | Admitting: EMERGENCY MEDICINE

## 2022-12-06 VITALS
RESPIRATION RATE: 16 BRPM | TEMPERATURE: 98 F | HEART RATE: 95 BPM | OXYGEN SATURATION: 98 % | WEIGHT: 184.97 LBS | SYSTOLIC BLOOD PRESSURE: 148 MMHG | DIASTOLIC BLOOD PRESSURE: 74 MMHG | HEIGHT: 65 IN

## 2022-12-06 DIAGNOSIS — Z87.39 PERSONAL HISTORY OF OTHER DISEASES OF THE MUSCULOSKELETAL SYSTEM AND CONNECTIVE TISSUE: ICD-10-CM

## 2022-12-06 DIAGNOSIS — M54.50 LOW BACK PAIN, UNSPECIFIED: ICD-10-CM

## 2022-12-06 DIAGNOSIS — G89.29 OTHER CHRONIC PAIN: ICD-10-CM

## 2022-12-06 DIAGNOSIS — Z88.6 ALLERGY STATUS TO ANALGESIC AGENT: ICD-10-CM

## 2022-12-06 DIAGNOSIS — F31.9 BIPOLAR DISORDER, UNSPECIFIED: ICD-10-CM

## 2022-12-06 DIAGNOSIS — Z86.59 PERSONAL HISTORY OF OTHER MENTAL AND BEHAVIORAL DISORDERS: ICD-10-CM

## 2022-12-06 DIAGNOSIS — Z88.2 ALLERGY STATUS TO SULFONAMIDES: ICD-10-CM

## 2022-12-06 DIAGNOSIS — F20.9 SCHIZOPHRENIA, UNSPECIFIED: ICD-10-CM

## 2022-12-06 PROCEDURE — 99284 EMERGENCY DEPT VISIT MOD MDM: CPT

## 2022-12-06 RX ORDER — ACETAMINOPHEN 500 MG
975 TABLET ORAL ONCE
Refills: 0 | Status: COMPLETED | OUTPATIENT
Start: 2022-12-06 | End: 2022-12-06

## 2022-12-06 RX ORDER — LIDOCAINE 4 G/100G
1 CREAM TOPICAL
Qty: 4 | Refills: 0
Start: 2022-12-06

## 2022-12-06 RX ORDER — CYCLOBENZAPRINE HYDROCHLORIDE 10 MG/1
10 TABLET, FILM COATED ORAL ONCE
Refills: 0 | Status: COMPLETED | OUTPATIENT
Start: 2022-12-06 | End: 2022-12-06

## 2022-12-06 RX ORDER — LIDOCAINE 4 G/100G
1 CREAM TOPICAL ONCE
Refills: 0 | Status: COMPLETED | OUTPATIENT
Start: 2022-12-06 | End: 2022-12-06

## 2022-12-06 RX ADMIN — LIDOCAINE 1 PATCH: 4 CREAM TOPICAL at 21:15

## 2022-12-06 RX ADMIN — Medication 975 MILLIGRAM(S): at 20:19

## 2022-12-06 RX ADMIN — CYCLOBENZAPRINE HYDROCHLORIDE 10 MILLIGRAM(S): 10 TABLET, FILM COATED ORAL at 20:19

## 2022-12-06 NOTE — ED PROVIDER NOTE - PATIENT PORTAL LINK FT
You can access the FollowMyHealth Patient Portal offered by [facility] by registering at the following website: [email or web address]. By joining [employer]’s FollowMyHealth portal, you will also be able to view your health information using other applications (apps) compatible with our system.

## 2022-12-06 NOTE — ED PROVIDER NOTE - MUSCULOSKELETAL, MLM
Spine appears normal, range of motion is not limited, no muscle or joint tenderness Spine appears normal, range of motion is not limited. No midline spine TTP.

## 2022-12-06 NOTE — ED PROVIDER NOTE - NSFOLLOWUPINSTRUCTIONS_ED_ALL_ED_FT
Please follow up with your primary care doctor fir pain control.     Back Pain    Back pain is very common in adults. The cause of back pain is rarely dangerous and the pain often gets better over time. The cause of your back pain may not be known and may include strain of muscles or ligaments, degeneration of the spinal disks, or arthritis. Occasionally the pain may radiate down your leg(s). Over-the-counter medicines to reduce pain and inflammation are often the most helpful. Stretching and remaining active frequently helps the healing process.     SEEK IMMEDIATE MEDICAL CARE IF YOU HAVE ANY OF THE FOLLOWING SYMPTOMS: bowel or bladder control problems, unusual weakness or numbness in your arms or legs, nausea or vomiting, abdominal pain, fever, dizziness/lightheadedness.

## 2022-12-06 NOTE — ED PROVIDER NOTE - OBJECTIVE STATEMENT
43 yo F with hx of chronic LBP with frequent Ed visits, p/w back pain radiating down right leg. 43 yo F with PMHx of chronic back pain 2/2 herniated lumbar disc, schizoaffective, bipolar, polysubstance abuse presenting c/o worsening lower back pain radiating down right leg x 2 wks. Was seen in ED for same 4 days ago. Pt reports usually taking tylenol and gabapentin for chronic back pain 2/2 herniated lumbar disc. Was bending over to tie her shoes one wk ago and noted progressive worsening pain radiating to BLE and hips. Noted R > L and currently doesn't have a pain management doctor. Denies fever, chills, dysuria, hematuria, flank pain, change in urinary/bowel function, numbness, tingling, focal weakness, abdominal pain, N/V/D/C, melena, hematochezia, CP, SOB, palpitations, diaphoresis, dizziness, HA, cough, rash, trauma, and fall.

## 2022-12-06 NOTE — ED PROVIDER NOTE - CLINICAL SUMMARY MEDICAL DECISION MAKING FREE TEXT BOX
Pt p/w back pain radiating down right leg  Pain meds given. Pt to f/up outpt. 43 yo F with PMHx of chronic back pain 2/2 herniated lumbar disc, schizoaffective, bipolar, polysubstance abuse presenting c/o worsening lower back pain radiating down right leg x 2 wks. Was seen in ED for same 4 days ago. Pt reports usually taking tylenol and gabapentin for chronic back pain 2/2 herniated lumbar disc. Was bending over to tie her shoes one wk ago and noted progressive worsening pain radiating to BLE and hips. Noted R > L and currently doesn't have a pain management doctor. Denies fever, chills, dysuria, hematuria, flank pain, change in urinary/bowel function, numbness, tingling, focal weakness, abdominal pain, N/V/D/C, melena, hematochezia, CP, SOB, palpitations, diaphoresis, dizziness, HA, cough, rash, trauma, and fall.  ED course: VS noted and WNL. Pt given Non-toxic appearing and stable for discharge. To follow up outpatient. Strict return precautions given. 43 yo F with PMHx of chronic back pain 2/2 herniated lumbar disc, schizoaffective, bipolar, polysubstance abuse presenting c/o worsening lower back pain radiating down right leg x 2 wks. Was seen in ED for same 4 days ago. Pt reports usually taking tylenol and gabapentin for chronic back pain 2/2 herniated lumbar disc. Was bending over to tie her shoes one wk ago and noted progressive worsening pain radiating to BLE and hips. Noted R > L and currently doesn't have a pain management doctor. Denies fever, chills, dysuria, hematuria, flank pain, change in urinary/bowel function, numbness, tingling, focal weakness, abdominal pain, N/V/D/C, melena, hematochezia, CP, SOB, palpitations, diaphoresis, dizziness, HA, cough, rash, trauma, and fall.  ED course: VS noted and WNL. Pt given Tylenol/ Flexeril and Lidocaine patch. Non-toxic appearing and stable for discharge. To follow up outpatient with pain management. Strict return precautions given. 41 yo F with PMHx of chronic back pain 2/2 herniated lumbar disc, schizoaffective, bipolar, polysubstance abuse presenting c/o worsening lower back pain radiating down right leg x 2 wks. Was seen in ED for same 4 days ago. Pt reports usually taking tylenol and gabapentin for chronic back pain 2/2 herniated lumbar disc. Was bending over to tie her shoes one wk ago and noted progressive worsening pain radiating to BLE and hips. Noted R > L and currently doesn't have a pain management doctor. Denies fever, chills, dysuria, hematuria, flank pain, change in urinary/bowel function, numbness, tingling, focal weakness, abdominal pain, N/V/D/C, melena, hematochezia, CP, SOB, palpitations, diaphoresis, dizziness, HA, cough, rash, trauma, and fall.  ED course: VS noted and WNL. Pt given Tylenol/ Flexeril and Lidocaine patch. Non-toxic appearing and stable for discharge. UA and Urine preg negative, To follow up outpatient with pain management. Strict return precautions given. 41 yo F with PMHx of chronic back pain 2/2 herniated lumbar disc, schizoaffective, bipolar, polysubstance abuse presenting c/o worsening lower back pain radiating down right leg x 2 wks. Was seen in ED for same 4 days ago. Pt reports usually taking tylenol and gabapentin for chronic back pain 2/2 herniated lumbar disc. Was bending over to tie her shoes one wk ago and noted progressive worsening pain radiating to BLE and hips. Noted R > L and currently doesn't have a pain management doctor. Denies fever, chills, dysuria, hematuria, flank pain, change in urinary/bowel function, numbness, tingling, focal weakness, abdominal pain, N/V/D/C, melena, hematochezia, CP, SOB, palpitations, diaphoresis, dizziness, HA, cough, rash, trauma, and fall.  ED course: VS noted and WNL. Pt given Tylenol/ Flexeril and Lidocaine patch. Non-toxic appearing and stable for discharge. To follow up outpatient with pain management. Strict return precautions given.

## 2022-12-07 PROBLEM — F10.10 ALCOHOL ABUSE, UNCOMPLICATED: Chronic | Status: ACTIVE | Noted: 2022-01-23

## 2022-12-07 PROBLEM — Z78.9 OTHER SPECIFIED HEALTH STATUS: Chronic | Status: ACTIVE | Noted: 2022-01-01

## 2022-12-07 PROBLEM — F19.10 OTHER PSYCHOACTIVE SUBSTANCE ABUSE, UNCOMPLICATED: Chronic | Status: ACTIVE | Noted: 2022-01-23

## 2022-12-07 PROBLEM — F32.9 MAJOR DEPRESSIVE DISORDER, SINGLE EPISODE, UNSPECIFIED: Chronic | Status: ACTIVE | Noted: 2022-01-23

## 2023-03-15 NOTE — ED ADULT NURSE NOTE - NSFALLRSKASSESSTYPE_ED_ALL_ED
Application Tool (Optional): Liquid Nitrogen Sprayer Show Topical Anesthesia Variable?: Yes Consent: The patient's consent was obtained including but not limited to risks of crusting, scabbing, blistering, scarring, darker or lighter pigmentary change, recurrence, incomplete removal and infection. Add 52 Modifier (Optional): no Spray Paint Text: The liquid nitrogen was applied to the skin utilizing a spray paint frosting technique. Post-Care Instructions: I reviewed with the patient in detail post-care instructions. Patient is to wear sunprotection, and avoid picking at any of the treated lesions. Pt may apply Vaseline to crusted or scabbing areas. Detail Level: Detailed Duration Of Freeze Thaw-Cycle (Seconds): 5-10 Aperture Size (Optional): A Medical Necessity Clause: This procedure was medically necessary because the lesions that were treated were: Medical Necessity Information: It is in your best interest to select a reason for this procedure from the list below. All of these items fulfill various CMS LCD requirements except the new and changing color options. Number Of Freeze-Thaw Cycles: 3 freeze-thaw cycles Initial (On Arrival)

## 2023-05-03 NOTE — ED PROVIDER NOTE - ENMT NEGATIVE STATEMENT, MLM
Ears: no ear pain and no hearing problems. Nose: no nasal congestion and no nasal drainage. Mouth/Throat: no dysphagia, no hoarseness and no throat pain. Neck: no lumps, no pain, no stiffness and no swollen glands. [Fatigue] : fatigue [Visual Field Defect] : visual field defect [Negative] : Endocrine

## 2023-09-13 NOTE — ED ADULT NURSE NOTE - IS THE PATIENT ABLE TO BE SCREENED?
Detail Level: Detailed Add 59995 Cpt? (Important Note: In 2017 The Use Of 52808 Is Being Tracked By Cms To Determine Future Global Period Reimbursement For Global Periods): yes No

## 2024-12-30 NOTE — ED PROVIDER NOTE - NS ED MD DISPO DISCHARGE CCDA
Brief Postoperative Note      Patient: Rehana Oropeza  YOB: 1956  MRN: [id number]    Date of Procedure: 12/30/2024    Pre-Op Diagnosis: Lipoma Right Upper Back.    Post-Op Diagnosis:  Same.       Procedure:   Excise Lipoma Right Upper Back.    Surgeon(s):  Bk Bocanegra MD    Assistant: Francisco Morton SA.    Anesthesia: General    Estimated Blood Loss (mL): Approximately 5 ml.    Complications: None    Specimens:   ID Type Source Tests Collected by Time Destination   1 : LIPOMA RIGHT UPPER BACK Tissue Back SURGICAL PATHOLOGY Bk Bocanegra MD 12/30/2024 0807        Implants:  * No implants in log *      Drains: * No LDAs found *    Findings:  Infection Present At Time Of Surgery (PATOS) (choose all levels that have infection present):  No infection present  Other Findings: Lipoma right upper back - subcutaneous, approximately 9 cm x 8 cm.  This procedure was not performed to treat primary cutaneous melanoma through wide local excision    Electronically signed by Bk Bocanegra MD on 12/30/2024 at 8:19 AM  
Patient/Caregiver provided printed discharge information.